# Patient Record
Sex: FEMALE | Race: WHITE
[De-identification: names, ages, dates, MRNs, and addresses within clinical notes are randomized per-mention and may not be internally consistent; named-entity substitution may affect disease eponyms.]

---

## 2017-12-20 NOTE — OPERATIVE REPORT
DATE OF PROCEDURE:  December 20, 2017 



REFERRING PHYSICIAN:  Dr. Guzman



PROCEDURE PERFORMED:  Colonoscopy.  



INDICATIONS FOR COLONOSCOPY:  Patient with history of ulcerative colitis.  

She is in for surveillance colonoscopy.



MEDICATION:  Patient was done under MAC.  Please see anesthesiologist's 

note.



PROCEDURE:  With the patient in the left lateral decubitus position, the 

flexible fiberoptic Olympus colonoscope was inserted into the rectum with 

ease and advanced all the way to the cecum.  It was then withdrawn slowly.  

Overall, the colon appeared to be within normal limits.  Four-quadrant 

biopsies grossly every 10 cm were obtained.  The scope was then retroflexed 

into the distal rectum and small internal hemorrhoids were noted, none of 

which were actively bleeding.  The scope was then straightened out.  The 

rectosigmoid area, as well as the distal rectal area were decompressed.  

The scope was subsequently withdrawn.  Patient tolerated the procedure 

well.



IMPRESSION

1.  A 4-quadrant biopsies at grossly 10 cm intervals were obtained.

2.  Internal hemorrhoids, none actively bleeding.





PLAN:  Follow up histology.  Continue current therapy.  Timing of followup 

colonoscopy pending pathology report.  

  









DD:  12/20/2017 09:22

DT:  12/20/2017 09:28

Job#:  [id number]



cc:  DR. GUZMAN

## 2018-02-13 NOTE — DIAGNOSTIC IMAGING REPORT
PROCEDURE:

BARIUM SWALLOW was performed with Sodium Carbonate and Barium. 

 

TECHNIQUE:Multiple fluoroscopic spot images were acquired during the 

dynamic evaluation of the esophagus after the administration of 

effervescent crystals and thin and thick barium. The images were 

obtained in the upright and supine positions from multiple obliquities.

 

COMPARISON: None.

 

INDICATIONS:   CHOKING WHILE DRINKING

 

FINDINGS:

Swallow:  The swallowing mechanism was grossly normal. The esophagus 

was normally distensible and the mucosa was within normal limits.  

Esophageal motility was within normal limits.

 

Gastroesophageal junction:  There is no evidence of hiatal hernia.

 

Reflux:  Assessment for gastroesophageal reflux demonstrated no 

evidence of reflux.

 

The visualized portion of the stomach and proximal small bowel are 

unremarkable.

 

IMPRESSION:

 

No acute radiographic abnormality.

 

 

Dictated by:  Jose Oneill M.D. on 2/13/2018 at 9:16     

Electronically approved by:  Jose Oneill M.D. on 2/13/2018 at 9:16

## 2018-12-22 ENCOUNTER — HOSPITAL ENCOUNTER (OUTPATIENT)
Dept: HOSPITAL 88 - OR | Age: 53
Discharge: HOME | End: 2018-12-22
Attending: INTERNAL MEDICINE
Payer: COMMERCIAL

## 2018-12-22 VITALS — SYSTOLIC BLOOD PRESSURE: 136 MMHG | DIASTOLIC BLOOD PRESSURE: 83 MMHG

## 2018-12-22 DIAGNOSIS — K51.80: Primary | ICD-10-CM

## 2018-12-22 DIAGNOSIS — K64.8: ICD-10-CM

## 2018-12-22 DIAGNOSIS — Z79.82: ICD-10-CM

## 2018-12-22 DIAGNOSIS — K62.89: ICD-10-CM

## 2018-12-22 PROCEDURE — 45380 COLONOSCOPY AND BIOPSY: CPT

## 2018-12-22 PROCEDURE — 81025 URINE PREGNANCY TEST: CPT

## 2018-12-22 NOTE — OPERATIVE REPORT
DATE OF PROCEDURE:  December 22, 2018 



REFERRING PHYSICIAN:  Dr. Hermelindo White.



PROCEDURE PERFORMED:  Colonoscopy with biopsies.



INDICATIONS FOR PROCEDURE:  Recurrent rectal bleeding, history of 

ulcerative colitis.



MEDICATION:  Patient was done under MAC.  Please see anesthesiologist's 

note.



PROCEDURE:  With the patient in left lateral decubitus position, the 

flexible fiberoptic Olympus colonoscope was inserted into the rectum with 

ease and advanced all the way to the cecum.  The scope was then withdrawn 

slowly.  Mucosa overlying the cecum, ascending colon, transverse colon 

appeared to be within normal limits.  The mucosa overlying the left colon 

revealed some patchy mild inflammatory changes and random biopsies were 

obtained or mild inflammatory changes were noted in the rectum and biopsies 

were obtained.  The scope was then retroflexed into the distal rectum and 

moderate-size internal hemorrhoids were noted, none of which was actively 

bleeding.  The scope was then straightened out and was subsequently 

withdrawn.  Patient tolerated the procedure well.



IMPRESSION

1. Mild patchy inflammatory changes, left colon.

2. Proctitis, mild.

3. Internal hemorrhoids, none actively bleeding.





PLAN:  Follow up histology.  Continue Apriso.  Start Anucort suppositories 

b.i.d. x10 days then p.r.n..  Patient will need a surveillance colonoscopy 

in 2 to 3 years.









DD:  12/22/2018 10:20

DT:  12/22/2018 11:31

Job#:  [id number]



cc:HERMELINDO WHITE MD

## 2018-12-24 NOTE — XMS REPORT
Summary of Care: 18 - 18

                             Created on: 2043



ISAIAH SAUCEDA

External Reference #: [id number]

: 1965

Sex: Female



Demographics







                          Address                   [address] [place] [address]-

 

                          Home Phone                [phone]

 

                          Preferred Language        Unknown

 

                          Marital Status            

 

                          Uatsdin Affiliation     Jew

 

                          Race                      Other

 

                                        Additional Race(s)  

 

                          Ethnic Group              Non-





Author







                          Author                    [facility]

 

                          Organization              [facility]

 

                          Address                   Unknown

 

                          Phone                     Unavailable







Encounter





HQ Elise(FIN) [id number] Date(s): 18 - 18

[facility] [address] [facility] [address] [phone]
[phone]

Discharge Disposition: Home or Self Care

Attending Physician: Augusto Stevenson MD





Vital Signs







 



                           Most recent to            1



                                         oldest [Reference 



                                         Range]: 

 

 



                           Height                    154.94 cm



                                         (18 1:51 PM)

 

 



                           Temperature Oral          98.2 DegF



                           [96.4-99.1 DegF]          (18 1:51 PM)

 

 



                           Blood Pressure            128/70 mmHg



                           [/60-90 mmHg]       (18 1:51 PM)

 

 



                           Peripheral Pulse          63 bpm



                           Rate [ bpm]         (18 1:51 PM)

 

 



                           Weight                    69.545 kg



                                         (18 1:51 PM)

 

 



                           Body Mass Index           28.97 m2



                                         (18 1:51 PM)







Problem List







    



              Condition     Effective Dates     Status       Health Status     Informant

 

    



                           Acute low back            Active  



                                         pain(Confirmed)    

 

    



                           Anxiety(Confirmed)        Active  







Allergies, Adverse Reactions, Alerts







   



                 Substance       Reaction        Severity        Status

 

   



                           NKDA                      Active







Medications





Lyrica

75 mg, PO, BID, # 60 cap, 0 Refill(s)

Start Date: 18

Status: Ordered



predniSONE

20 mg, PO, Daily, Quantity sufficient, 0 Refill(s)

Start Date: 18

Status: Ordered



Results





No data available for this section



Immunizations





No data available for this section



Procedures







    



              Procedure     Date         Related Diagnosis     Body Site     Status

 

    



                     Microdiscectomy     10/10/17            Completed

 

    



                     Epidural steroid injection1     17             Completed

 

    



                     Epidural steroid injection2     17             Completed

 

    



                           Cholecystectomy           Completed







1Right transforaminal epidural steroid injection, L4-5 and L5-S1 #2



2Right Transforaminal Epidural Steroid Injection at L4/5 and L5/S1



Social History







 



                           Social History Type       Response

 

 



                           Smoking Status            Former smoker; Exposure to Tobacco Smoke None; Cigarette Smoking

 Last 365



                                         Days No; Reg Smoking Cessation Counseling No; Started at age: 16.0; Stopped



                                         at age: 22; 1



                                         entered on: 18







1Former smoker; quit , prior to that smoked approx 1ppd



Assessment and Plan





No data available for this section

## 2018-12-24 NOTE — XMS REPORT
Summary of Care: 18 - 1/3/18

                             Created on: 2075



ISAIAH SAUCEDA

External Reference #: [id number]

: 1965

Sex: Female



Demographics







                          Address                   [address] [place]  [address]-

 

                          Home Phone                [phone]

 

                          Preferred Language        Unknown

 

                          Marital Status            

 

                          Oriental orthodox Affiliation     Yazidism

 

                          Race                      Other

 

                          Additional Race(s)        Other



 

                          Ethnic Group              Non-





Author







                          Author                    [facility]

 

                          Organization              [facility]

 

                          Address                   Unknown

 

                          Phone                     Unavailable







Encounter





HQ Anyir_shanta(FIN) [phone] Date(s): 18 - 1/3/18

[facility] [address] [facility], [address] [place] [address]      [phone]
[phone]





Vital Signs





No data available for this section



Problem List







    



              Condition     Effective Dates     Status       Health Status     Informant

 

    



                           Acute low back            Active  



                                         pain(Confirmed)    

 

    



                           Anxiety(Confirmed)        Active  







Allergies, Adverse Reactions, Alerts







   



                 Substance       Reaction        Severity        Status

 

   



                           NKDA                      Active







Medications





No data available for this section



Results





No data available for this section



Immunizations





No data available for this section



Procedures







   



                 Procedure       Date            Related Diagnosis     Body Site

 

   



                           Microdiscectomy           10/10/17  

 

   



                           Epidural steroid injection1     17  

 

   



                           Epidural steroid injection2     17  

 

   



                                         Cholecystectomy   







1Right transforaminal epidural steroid injection, L4-5 and L5-S1 #2



2Right Transforaminal Epidural Steroid Injection at L4/5 and L5/S1



Social History







 



                           Social History Type       Response

 

 



                           Smoking Status            Former smoker; Exposure to Tobacco Smoke None; Cigarette Smoking

 Last 365



                                         Days No; Reg Smoking Cessation Counseling No; Started at age: 16.0; Stopped



                                         at age: 22; 1







1Former smoker; quit , prior to that smoked approx 1ppd



Assessment and Plan





No data available for this section

## 2018-12-24 NOTE — XMS REPORT
Summary of Care: 3/26/18 - 3/27/18

                             Created on: 2018



ISAIAH SAUCEDA

External Reference #: [id number]

: 1965

Sex: Female



Demographics







                          Address                   [address] [place]  [address]-

 

                          Home Phone                [phone]

 

                          Preferred Language        Unknown

 

                          Marital Status            

 

                          Mormonism Affiliation     Orthodox

 

                          Race                      Other

 

                                        Additional Race(s)  

 

                          Ethnic Group              Non-





Author







                          Author                    [facility]

 

                          Organization              [facility]

 

                          Address                   Unknown

 

                          Phone                     Unavailable







Encounter





HQ Encntr_alias(FIN) [id number] Date(s): 3/26/18 - 3/27/18

[facility] [address] [facility] [address] [phone]
[phone]





Vital Signs





No data available for this section



Problem List







    



              Condition     Effective Dates     Status       Health Status     Informant

 

    



                           Acute low back            Active  



                                         pain(Confirmed)    

 

    



                           Anxiety(Confirmed)        Active  







Allergies, Adverse Reactions, Alerts







   



                 Substance       Reaction        Severity        Status

 

   



                           NKDA                      Active







Medications





No data available for this section



Results





No data available for this section



Immunizations





No data available for this section



Procedures







    



              Procedure     Date         Related Diagnosis     Body Site     Status

 

    



                     Microdiscectomy     10/10/17            Completed

 

    



                     Epidural steroid injection1     17             Completed

 

    



                     Epidural steroid injection2     17             Completed

 

    



                           Cholecystectomy           Completed







1Right transforaminal epidural steroid injection, L4-5 and L5-S1 #2



2Right Transforaminal Epidural Steroid Injection at L4/5 and L5/S1



Social History







 



                           Social History Type       Response

 

 



                           Smoking Status            Former smoker; Exposure to Tobacco Smoke None; Cigarette Smoking

 Last 365



                                         Days No; Reg Smoking Cessation Counseling No; Started at age: 16.0; Stopped



                                         at age: 22; 1



                                         entered on: 18







1Former smoker; quit , prior to that smoked approx 1ppd



Assessment and Plan





No data available for this section

## 2018-12-24 NOTE — XMS REPORT
Summary of Care: 18 - 18

                             Created on: 2116



ISAIAH SAUCEDA

External Reference #: [id number]

: 1965

Sex: Female



Demographics







                          Address                   [address] [place] [address]  [address]

 

                          Home Phone                [phone]

 

                          Preferred Language        Unknown

 

                          Marital Status            

 

                          Sabianist Affiliation     Yazidi

 

                          Race                      Other

 

                                        Additional Race(s)  

 

                          Ethnic Group              Non-





Author







                          Author                    [facility]

 

                          Organization              [facility]

 

                          Address                   Unknown

 

                          Phone                     Unavailable







Encounter





HQ Encntr_shanta(FIN) [phone] Date(s): 18 - 18

[facility] [address] [facility] [address] [phone]
[phone]





Vital Signs





No data available for this section



Problem List







    



              Condition     Effective Dates     Status       Health Status     Informant

 

    



                           Acute low back            Active  



                                         pain(Confirmed)    

 

    



                           Anxiety(Confirmed)        Active  







Allergies, Adverse Reactions, Alerts







   



                 Substance       Reaction        Severity        Status

 

   



                           NKDA                      Active







Medications





No data available for this section



Results





No data available for this section



Immunizations





No data available for this section



Procedures







    



              Procedure     Date         Related Diagnosis     Body Site     Status

 

    



                     Microdiscectomy     10/10/17            Completed

 

    



                     Epidural steroid injection1     17             Completed

 

    



                     Epidural steroid injection2     17             Completed

 

    



                           Cholecystectomy           Completed







1Right transforaminal epidural steroid injection, L4-5 and L5-S1 #2



2Right Transforaminal Epidural Steroid Injection at L4/5 and L5/S1



Social History







 



                           Social History Type       Response

 

 



                           Smoking Status            Former smoker; Exposure to Tobacco Smoke None; Cigarette Smoking

 Last 365



                                         Days No; Reg Smoking Cessation Counseling No; Started at age: 16.0; Stopped



                                         at age: 22; 1



                                         entered on: 18







1Former smoker; quit , prior to that smoked approx 1ppd



Assessment and Plan





No data available for this section

## 2018-12-24 NOTE — XMS REPORT
Summary of Care: 10/10/17 - 10/10/17

                             Created on: 2120



ISAIAH SAUCEDA

External Reference #: [id number]

: 1965

Sex: Female



Demographics







                          Address                   [address] [place]  [address]-

 

                          Home Phone                [phone]

 

                          Preferred Language        Unknown

 

                          Marital Status            

 

                          Taoist Affiliation     Yarsani

 

                          Race                      Other

 

                          Ethnic Group              Non-





Author







                          Author                    [facility]

 

                          Organization              [facility]

 

                          Address                   Unknown

 

                          Phone                     Unavailable







Encounter





LAURA Olivares(FIN) [id number] Date(s): 10/10/17 - 10/10/17

[facility] [address] [phone]
[phone]

Discharge Disposition: Home or Self Care

Attending Physician: Augusto Stevenson MD

Admitting Physician: Augusto Stevenson MD

Referring Physician: Auugsto Stevenson MD





Vital Signs







                    1                   2                   3



                                         Most recent to   



                                         oldest [Reference   



                                         Range]:   

 

                                        154.94 cm 

                          (10/10/17 8:06 AM)        154.94 cm 

                          (10/9/17 2:03 PM)          



                                         Height   

 

                                        139/71 mmHg 

                          (10/10/17 11:54 AM)       126/70 mmHg 

                          (10/10/17 11:30 AM)       124/69 mmHg 

                                        (10/10/17 11:15 AM)



                                         Blood Pressure   



                                         [/60-90 mmHg]   

 

                                        16 BRMIN 

                          (10/10/17 11:54 AM)       11 BRMIN 

*LOW*

                          (10/10/17 11:30 AM)       13 BRMIN 

*LOW*

                                        (10/10/17 11:15 AM)



                                         Respiratory Rate   



                                         [14-20 BRMIN]   

 

                                        72 bpm 

                          (10/10/17 8:06 AM)        66 bpm 

                          (10/9/17 2:03 PM)          



                                         Peripheral Pulse   



                                         Rate [ bpm]   

 

                                        68.182 kg 

                          (10/10/17 8:06 AM)        68.182 kg 

                          (10/9/17 2:03 PM)          



                                         Weight   

 

                                        28.4 m2 

                          (10/10/17 8:06 AM)        28.4 m2 

                          (10/9/17 2:03 PM)          



                                         Body Mass Index   







Problem List







    



              Condition     Effective Dates     Status       Health Status     Informant

 

    



                           Acute low back            Active  



                                         pain(Confirmed)    

 

    



                           Anxiety(Confirmed)        Active  







Allergies, Adverse Reactions, Alerts







   



                 Substance       Reaction        Severity        Status

 

   



                           NKDA                      Active







Medications





acetaminophen (ANES) (ANES)

Route: IV, Drug form: INJ, Start date: 10/10/17 10:45:00 CDT, Stop date: 10/10/1
7 11:45:00 CDT

Start Date: 10/10/17

Stop Date: 10/10/17

Status: Completed



ceFAZolin (ANES)

Route: IV, Drug form: INJ, ONCE, Stop date: 10/10/17 11:05:00 CDT

Start Date: 10/10/17

Stop Date: 10/10/17

Status: Completed



dexamethasone (ANES)

Route: IV, Drug form: INJ, ONCE, Stop date: 10/10/17 11:10:00 CDT

Start Date: 10/10/17

Stop Date: 10/10/17

Status: Completed



docusate sodium 50 mg oral capsule

50 mg=1 cap, PO, BID, PRN Constipation, # 60 cap, 0 Refill(s)

Start Date: 10/10/17

Status: Ordered



famotidine 20 mg oral tablet

20 mg=1 tab, PO, BID, # 60 tab, 0 Refill(s)

Start Date: 10/10/17

Status: Ordered



fentaNYL (ANES)

Route: IV, Drug form: INJ, ONCE, Stop date: 10/10/17 11:10:00 CDT

Start Date: 10/10/17

Stop Date: 10/10/17

Status: Completed



lidocaine (ANES)

Route: IV, Drug form: INJ, ONCE, Stop date: 10/10/17 11:10:00 CDT

Start Date: 10/10/17

Stop Date: 10/10/17

Status: Completed



LR 1000 mL INJ (ANES)

Route: IV, Total Volume: 1,000, Start date: 10/10/17 10:11:00 CDT, Stop date: 10
/10/17 11:11:00 CDT

Start Date: 10/10/17

Stop Date: 10/10/17

Status: Completed



Medrol Dosepak 4 mg oral tablet

See Instructions, PO, Take by mouth as directed on label., # 1 Pack, 0 Refill(s)

Start Date: 10/10/17

Stop Date: 10/16/17

Status: Ordered



midazolam (ANES)

Route: IV, Drug form: SOLN, ONCE, Stop date: 10/10/17 11:05:00 CDT

Start Date: 10/10/17

Stop Date: 10/10/17

Status: Completed



ondansetron (ANES)

Route: IV, Drug form: INJ, ONCE, Stop date: 10/10/17 11:19:00 CDT

Start Date: 10/10/17

Stop Date: 10/10/17

Status: Completed



propofol (ANES)

Route: IV, Drug form: INJ, ONCE, Stop date: 10/10/17 11:10:00 CDT

Start Date: 10/10/17

Stop Date: 10/10/17

Status: Completed



Robaxin 500 mg oral tablet

500 mg=1 tab, PO, QID, # 90 tab, 0 Refill(s)

Start Date: 10/10/17

Status: Ordered



rocuronium (ANES)

Route: IV, Drug form: INJ, ONCE, Stop date: 10/10/17 11:10:00 CDT

Start Date: 10/10/17

Stop Date: 10/10/17

Status: Completed



Senna-gen 8.6 mg oral tablet

17.2 mg=2 tab, PO, Bedtime, PRN for constipation, X 50 day, # 100 tab, 0 Refill(
s)

Start Date: 10/10/17

Stop Date: 17

Status: Ordered



sugammadex

500 mg, 5 mL, Route: IV, Drug form: SOLN, ONCE, Start date: 10/10/17 13:45:00 CD
T, Stop date: 10/10/17 13:45:00 CDT

Notes: (Same as: Bridion)

Start Date: 10/10/17

Stop Date: 10/10/17

Status: Ordered



sugammadex (ANES)

Route: IV, Drug form: SOLN, ONCE, Stop date: 10/10/17 11:10:00 CDT

Start Date: 10/10/17

Stop Date: 10/10/17

Status: Completed



Results





ELECTROLYTES





 



                           Most recent to            1



                                         oldest [Reference 



                                         Range]: 

 

 



                           Sodium Lvl [135-145       143 mEq/L



                           mEq/L]                    (10/9/17 1:30 PM)

 

 



                           Potassium Lvl             4.4 mEq/L



                           [3.5-5.1 mEq/L]           (10/9/17 1:30 PM)

 

 



                           Chloride Lvl [      108 mEq/L



                           mEq/L]                    (10/9/17 1:30 PM)

 

 



                           CO2 [24-32 mEq/L]         28 mEq/L



                                         (10/9/17 1:30 PM)

 

 



                           AGAP [10.0-20.0           11.4 mEq/L



                           mEq/L]                    (10/9/17 1:30 PM)







CHEM PANEL





 



                           Most recent to            1



                                         oldest [Reference 



                                         Range]: 

 

 



                           Creatinine Lvl            0.65 mg/dL



                           [0.50-1.40 mg/dL]         (10/9/17 1:30 PM)

 

 



                           eGFR                      103 mL/min/1.73m2 1



                                         *NA*



                                         (10/9/17 1:30 PM)

 

 



                           BUN [7-22 mg/dL]          9 mg/dL



                                         (10/9/17 1:30 PM)

 

 



                           Glucose Lvl [70-99        75 mg/dL



                           mg/dL]                    (10/9/17 1:30 PM)

 

 



                           Calcium Lvl               8.7 mg/dL



                           [8.5-10.5 mg/dL]          (10/9/17 1:30 PM)







1Result Comment: The eGFR is calculated using the CKD-EPI formula. In most 
young, healthy individuals the eGFR will be >90 mL/min/1.73m2. The eGFR declines
with age. An eGFR of 60-89 may be normal in some populations, particularly the 
elderly, for whom the CKD-EPI formula has not been extensively validated. Use of
the eGFR is not recommended in the following populations:



Individuals with unstable creatinine concentrations, including pregnant patients
and those with serious co-morbid conditions.



Patients with extremes in muscle mass or diet. 



The data above are obtained from the National Kidney Disease Education Program (
NKDEP) which additionally recommends that when the eGFR is used in patients with
extremes of body mass index for purposes of drug dosing, the eGFR should be mul
tiplied by the estimated BMI.



HEMATOLOGY





 



                           Most recent to            1



                                         oldest [Reference 



                                         Range]: 

 

 



                           Hgb [12.0-16.0 g/dL]      12.9 g/dL



                                         (10/9/17 1:30 PM)

 

 



                           Hct [36.0-48.0 %]         38.9 %



                                         (10/9/17 1:30 PM)

 

 



                           PT [12.0-14.7             13.4 seconds



                           seconds]                  (10/9/17 1:30 PM)

 

 



                           INR [0.85-1.17]           1.00



                                         (10/9/17 1:30 PM)

 

 



                           PTT [22.9-35.8            29.1 seconds



                           seconds]                  (10/9/17 1:30 PM)

 

 



                           R-time [5.0-10.0          4.7 minutes



                           minutes]                  *LOW*



                                         (10/9/17 1:30 PM)

 

 



                           K-time [1.0-3.0           1.1 minutes



                           minutes]                  (10/9/17 1:30 PM)

 

 



                           Angle [53.0-72.0          75.1 degrees



                           degrees]                  *HI*



                                         (10/9/17 1:30 PM)

 

 



                           Max Amp [50.0-70.0        63.3 mm



                           mm]                       (10/9/17 1:30 PM)

 

 



                           G-value [4.5-11.0 K       8.6 K d/sc



                           d/sc]                     (10/9/17 1:30 PM)

 

 



                           Ly30 [0.0-7.5 %]          0.0 %



                                         (10/9/17 1:30 PM)

 

 



                           Coag Index                2.2



                           [-3.0-3.0]                (10/9/17 1:30 PM)

 

 



                           TEG Interp                Thrombelastograph results show shortened value of R and increased 

value of Angle



                                         Alpha. These findings are suggestive of enzymatic hypercoagulation.



                                         [id number]



                                         *NA*



                                         (10/9/17 1:30 PM)

 

 



                           TEG Data                  See Note



                                         (10/9/17 1:30 PM)







Immunizations





No data available for this section



Procedures







   



                 Procedure       Date            Related Diagnosis     Body Site

 

   



                           Epidural steroid injection1     17  

 

   



                           Epidural steroid injection2     17  

 

   



                                         Cholecystectomy   







1Right transforaminal epidural steroid injection, L4-5 and L5-S1 #2



2Right Transforaminal Epidural Steroid Injection at L4/5 and L5/S1



Social History







 



                           Social History Type       Response

 

 



                           Smoking Status            Former smoker; Started at age: 16.0; Stopped at age: 22; Exposure

 to



                                         Tobacco Smoke None; Cigarette Smoking Last 365 Days No; Reg Smoking



                                         Cessation Counseling No1







1Former smoker; quit , prior to that smoked approx 1ppd



Assessment and Plan





No data available for this section

## 2018-12-24 NOTE — XMS REPORT
Summary of Care: 17 - 17

                             Created on: 2103



ISAIAH SAUCEDA

External Reference #: [id number]

: 1965

Sex: Female



Demographics







                          Address                   [address] [place]  [address]-

 

                          Home Phone                [phone]

 

                          Preferred Language        Unknown

 

                          Marital Status            

 

                          Scientologist Affiliation     Hinduism

 

                          Race                      Other

 

                          Additional Race(s)        Other



 

                          Ethnic Group              Non-





Author







                          Author                    [facility]

 

                          Organization              [facility]

 

                          Address                   Unknown

 

                          Phone                     Unavailable







Encounter





HQ Anyir_shanta(FIN) [id number] Date(s): 17 - 17

[facility] [address] [facility], [address] [place] [address]      [phone]
[phone]





Vital Signs





No data available for this section



Problem List







    



              Condition     Effective Dates     Status       Health Status     Informant

 

    



                           Acute low back            Active  



                                         pain(Confirmed)    

 

    



                           Anxiety(Confirmed)        Active  







Allergies, Adverse Reactions, Alerts







   



                 Substance       Reaction        Severity        Status

 

   



                           NKDA                      Active







Medications





No data available for this section



Results





No data available for this section



Immunizations





No data available for this section



Procedures







   



                 Procedure       Date            Related Diagnosis     Body Site

 

   



                           Microdiscectomy           10/10/17  

 

   



                           Epidural steroid injection1     17  

 

   



                           Epidural steroid injection2     17  

 

   



                                         Cholecystectomy   







1Right transforaminal epidural steroid injection, L4-5 and L5-S1 #2



2Right Transforaminal Epidural Steroid Injection at L4/5 and L5/S1



Social History







 



                           Social History Type       Response

 

 



                           Smoking Status            Former smoker; Exposure to Tobacco Smoke None; Cigarette Smoking

 Last 365



                                         Days No; Reg Smoking Cessation Counseling No; Started at age: 16.0; Stopped



                                         at age: 22; 1







1Former smoker; quit , prior to that smoked approx 1ppd



Assessment and Plan





No data available for this section

## 2018-12-24 NOTE — XMS REPORT
Summary of Care: 18 - 18

                             Created on: 04/15/2076



ISAIAH SAUCEDA

External Reference #: [id number]

: 1965

Sex: Female



Demographics







                          Address                   [address] [place] [address]-

 

                          Home Phone                [phone]

 

                          Preferred Language        Unknown

 

                          Marital Status            

 

                          Sabianism Affiliation     Muslim

 

                          Race                      Other

 

                                        Additional Race(s)  

 

                          Ethnic Group              Non-





Author







                          Author                    [facility]

 

                          Organization              [facility]

 

                          Address                   Unknown

 

                          Phone                     Unavailable







Encounter





HQ Elise(FIN) [id number] Date(s): 18 - 18

[facility] [address] [facility] [address] [phone]
[phone]

Discharge Disposition: Home or Self Care

Attending Physician: Augusto Stevenson MD





Vital Signs







 



                           Most recent to            1



                                         oldest [Reference 



                                         Range]: 

 

 



                           Height                    154.94 cm



                                         (18 1:51 PM)

 

 



                           Temperature Oral          98.2 DegF



                           [96.4-99.1 DegF]          (18 1:51 PM)

 

 



                           Blood Pressure            128/70 mmHg



                           [/60-90 mmHg]       (18 1:51 PM)

 

 



                           Peripheral Pulse          63 bpm



                           Rate [ bpm]         (18 1:51 PM)

 

 



                           Weight                    69.545 kg



                                         (18 1:51 PM)

 

 



                           Body Mass Index           28.97 m2



                                         (18 1:51 PM)







Problem List







    



              Condition     Effective Dates     Status       Health Status     Informant

 

    



                           Acute low back            Active  



                                         pain(Confirmed)    

 

    



                           Anxiety(Confirmed)        Active  







Allergies, Adverse Reactions, Alerts







   



                 Substance       Reaction        Severity        Status

 

   



                           NKDA                      Active







Medications





Lyrica

75 mg, PO, BID, # 60 cap, 0 Refill(s)

Start Date: 18

Status: Ordered



predniSONE

20 mg, PO, Daily, Quantity sufficient, 0 Refill(s)

Start Date: 18

Status: Ordered



Results





No data available for this section



Immunizations





No data available for this section



Procedures







    



              Procedure     Date         Related Diagnosis     Body Site     Status

 

    



                     Microdiscectomy     10/10/17            Completed

 

    



                     Epidural steroid injection1     17             Completed

 

    



                     Epidural steroid injection2     17             Completed

 

    



                           Cholecystectomy           Completed







1Right transforaminal epidural steroid injection, L4-5 and L5-S1 #2



2Right Transforaminal Epidural Steroid Injection at L4/5 and L5/S1



Social History







 



                           Social History Type       Response

 

 



                           Smoking Status            Former smoker; Exposure to Tobacco Smoke None; Cigarette Smoking

 Last 365



                                         Days No; Reg Smoking Cessation Counseling No; Started at age: 16.0; Stopped



                                         at age: 22; 1



                                         entered on: 18







1Former smoker; quit , prior to that smoked approx 1ppd



Assessment and Plan





No data available for this section

## 2018-12-24 NOTE — XMS REPORT
Patient Summary Document

                             Created on: 2018



ISAIAH SAUCEDA

External Reference #: [id number]

: 1965

Sex: Female



Demographics







                          Address                   [address] [place]  [address]

 

                          Home Phone                [phone]

 

                          Preferred Language        Unknown

 

                          Marital Status            Unknown

 

                          Mandaen Affiliation     Unknown

 

                          Race                      Unknown

 

                                        Additional Race(s)  

 

                          Ethnic Group              Unknown





Author







                          Author                    [facility]

 

                          Address                   Unknown

 

                          Phone                     Unavailable







Care Team Providers







                    Care Team Member Name    Role                Phone

 

                    CHILO RAZO    Unavailable         Unavailable







Problems

This patient has no known problems.



Allergies, Adverse Reactions, Alerts

This patient has no known allergies or adverse reactions.



Medications

This patient has no known medications.



Results







           Test Description    Test Time    Test Comments    Text Results    Atomic Results    Result

 Comments

 

                BARIUM SWALLOW                                        [place] [address]      Patient Name: ISAIAH SAUCEDA   MR 
#: [id number]    : 1965 Age/Sex: 52/F  Acct #: [id number] Req #: [id number]
[id number]  Adm Physician:     Ordered by: CHILO RAZO MD  Report #: [id number]
[id number]   Location: DX  Room/Bed:     
________________________________________________________________________
___________________________    Procedure: [id number] DX/BARIUM SWALLOW  Exam 
Date: 18                            Exam Time: 0755       REPORT STATUS: 
Signed    PROCEDURE:   BARIUM SWALLOW was performed with Sodium Carbonate and 
Barium.        TECHNIQUE:   Multiple fluoroscopic spot images were acquired 
during the    dynamic evaluation of the esophagus after the administration of   
effervescent crystals and thin and thick barium. The images were    obtained in 
the upright and supine positions from multiple obliquities.       COMPARISON: 
None.       INDICATIONS:   CHOKING WHILE DRINKING       FINDINGS:   Swallow:  
The swallowing mechanism was grossly normal. The esophagus    was normally 
distensible and the mucosa was within normal limits.     Esophageal motility was
within normal limits.       Gastroesophageal junction:  There is no evidence of 
hiatal hernia.       Reflux:  Assessment for gastroesophageal reflux 
demonstrated no    evidence of reflux.       The visualized portion of the stom
ach and proximal small bowel are    unremarkable.       IMPRESSION:       No 
acute radiographic abnormality.           Dictated by:  La Khan M.D. on 
2018 at 9:16        Electronically approved by:  La Khan M.D. on 
2018 at 9:16                Dictated By: LA KHAN MD  Electronically 
Signed By: LA KHAN MD on 18  Transcribed By: CHERIE on 18       COPY TO:   CHILO RAZO MD

## 2020-06-18 ENCOUNTER — HOSPITAL ENCOUNTER (OUTPATIENT)
Dept: HOSPITAL 88 - OR | Age: 55
Discharge: HOME | End: 2020-06-18
Attending: INTERNAL MEDICINE
Payer: COMMERCIAL

## 2020-06-18 VITALS — DIASTOLIC BLOOD PRESSURE: 84 MMHG | SYSTOLIC BLOOD PRESSURE: 116 MMHG

## 2020-06-18 DIAGNOSIS — Z11.59: ICD-10-CM

## 2020-06-18 DIAGNOSIS — Z01.812: ICD-10-CM

## 2020-06-18 DIAGNOSIS — K21.9: ICD-10-CM

## 2020-06-18 DIAGNOSIS — K64.8: ICD-10-CM

## 2020-06-18 DIAGNOSIS — Z87.891: ICD-10-CM

## 2020-06-18 DIAGNOSIS — K31.7: ICD-10-CM

## 2020-06-18 DIAGNOSIS — K59.00: ICD-10-CM

## 2020-06-18 DIAGNOSIS — K62.89: ICD-10-CM

## 2020-06-18 DIAGNOSIS — Z01.810: ICD-10-CM

## 2020-06-18 DIAGNOSIS — K29.70: ICD-10-CM

## 2020-06-18 DIAGNOSIS — F41.9: ICD-10-CM

## 2020-06-18 DIAGNOSIS — K51.80: Primary | ICD-10-CM

## 2020-06-18 DIAGNOSIS — Z79.82: ICD-10-CM

## 2020-06-18 DIAGNOSIS — K20.9: ICD-10-CM

## 2020-06-18 LAB
C DIFFICILE TOXIN A&B AMP PROB: NEGATIVE
LACTOFERRIN STL QL: NEGATIVE

## 2020-06-18 PROCEDURE — 43239 EGD BIOPSY SINGLE/MULTIPLE: CPT

## 2020-06-18 PROCEDURE — 93005 ELECTROCARDIOGRAM TRACING: CPT

## 2020-06-18 PROCEDURE — 83993 ASSAY FOR CALPROTECTIN FECAL: CPT

## 2020-06-18 PROCEDURE — 87045 FECES CULTURE AEROBIC BACT: CPT

## 2020-06-18 PROCEDURE — 87635 SARS-COV-2 COVID-19 AMP PRB: CPT

## 2020-06-18 PROCEDURE — 45378 DIAGNOSTIC COLONOSCOPY: CPT

## 2020-06-18 PROCEDURE — 87493 C DIFF AMPLIFIED PROBE: CPT

## 2020-06-18 PROCEDURE — 83630 LACTOFERRIN FECAL (QUAL): CPT

## 2020-06-18 PROCEDURE — 87328 CRYPTOSPORIDIUM AG IA: CPT

## 2020-06-18 PROCEDURE — 45380 COLONOSCOPY AND BIOPSY: CPT

## 2020-06-18 PROCEDURE — 87177 OVA AND PARASITES SMEARS: CPT

## 2020-06-18 PROCEDURE — 81025 URINE PREGNANCY TEST: CPT

## 2020-06-18 NOTE — OPERATIVE REPORT
DATE OF PROCEDURE:  06/18/2020

 

SURGEON:  Rodney Rabago MD

 

PROCEDURES:  Esophagogastroduodenoscopy with polypectomy and colonoscopy with biopsies.

 

INDICATION FOR EGD:  Acid reflux, nausea.

 

INDICATIONS FOR COLONOSCOPY:  Rectal bleeding, history of ulcerative colitis.

 

MEDICATIONS:  The patient was done under MAC, please see anesthesiologist's note.

 

PROCEDURE IN DETAIL:  With the patient in left lateral decubitus position, the flexible

fiberoptic Olympus gastroscope was introduced into the esophagus under direct

visualization without any difficulty.  There was some patchy erythema noted in distal

esophagus.  The scope was then advanced with ease into the stomach.  Mucosa overlying

the antrum and the body revealed some patchy erythema and low-grade edema.  Biopsies

were obtained, sent to stain for H pylori.  Minute hyperplastic-appearing polyp was

noted in the body of the stomach along the lesser curvature and that was removed per the

cold biopsy forceps.  Pylorus was of normal contour and shape, was intubated with ease

and the scope was advanced all the way to the second portion of the duodenum.  The scope

was then withdrawn slowly.  Mucosa overlying the proximal second portion and duodenal

bulb grossly appeared to be within normal limits.  Biopsies were obtained to rule out

sprue.  The scope was then withdrawn back into the stomach and retroflexed.  Mucosa

overlying the fundus and cardia appeared to be within normal limits.  The scope was then

straightened out, it was subsequently withdrawn.  The patient tolerated the procedure

well. 

 

IMPRESSION:  

1. Distal esophagitis.

2. Gastritis, biopsied.  Biopsies sent to stain for Helicobacter pylori.

3. Minute polyp body removed per the cold biopsy forceps.

4. Rule out sprue.

 

PLAN:  Follow up histology.  Initiate Protonix 40 mg one p.o. q.a.m. a.c.

 

PROCEDURE IN DETAIL:  The patient was then turned around after adequate lubrication of

the anal canal, a flexible fiberoptic Olympus colonoscope was inserted into the rectum

with ease and advanced all the way to the cecum.  Mucosa overlying the cecum appeared to

be within normal limits.  The ileocecal valve was intubated and the scope was advanced

into the terminal ileum.  Biopsies were obtained.  The scope was then withdrawn back

into the colon.  It was then withdrawn slowly.  Mucosa overlying the ascending and the

transverse grossly appeared to be within normal limits.  The mucosa overlying the

descending, sigmoid and rectum revealed some patchy mild inflammatory changes.  Multiple

random biopsies were obtained.  The scope was then retroflexed into the distal rectum

and moderate-sized internal hemorrhoids were noted, none of which was actively bleeding.

 The scope was then straightened out, it was subsequently withdrawn after securing an

adequate stool specimen that was sent for the appropriate stool studies.  The patient

tolerated the procedure well. 

 

IMPRESSION:  

1. Mild patchy inflammatory changes, left colon, random biopsies obtained.

2. Proctitis, mild, biopsied.

3. Internal hemorrhoids, none actively bleeding.

 

PLAN:  Follow up histology.  Follow up stool studies.  Initiate VSL #3 one p.o. b.i.d.

Proctosol HC 25 mg suppositories b.i.d. x10 days and then p.r.n. 

 

 

 

 

______________________________

Rodney Rabago MD

 

[facility] [place]/[facility]

D:  06/18/2020 13:08:53

T:  06/18/2020 13:46:06

Job #:  [id number]